# Patient Record
Sex: MALE | Race: BLACK OR AFRICAN AMERICAN | NOT HISPANIC OR LATINO | URBAN - METROPOLITAN AREA
[De-identification: names, ages, dates, MRNs, and addresses within clinical notes are randomized per-mention and may not be internally consistent; named-entity substitution may affect disease eponyms.]

---

## 2022-11-12 ENCOUNTER — EMERGENCY (EMERGENCY)
Facility: HOSPITAL | Age: 52
LOS: 1 days | Discharge: HOME | End: 2022-11-12
Admitting: EMERGENCY MEDICINE

## 2022-11-12 VITALS
SYSTOLIC BLOOD PRESSURE: 145 MMHG | HEART RATE: 88 BPM | TEMPERATURE: 99 F | DIASTOLIC BLOOD PRESSURE: 95 MMHG | OXYGEN SATURATION: 99 % | RESPIRATION RATE: 16 BRPM

## 2022-11-12 VITALS
RESPIRATION RATE: 18 BRPM | SYSTOLIC BLOOD PRESSURE: 143 MMHG | TEMPERATURE: 98 F | HEART RATE: 100 BPM | DIASTOLIC BLOOD PRESSURE: 86 MMHG | OXYGEN SATURATION: 97 %

## 2022-11-12 PROCEDURE — 99283 EMERGENCY DEPT VISIT LOW MDM: CPT

## 2022-11-12 RX ORDER — IBUPROFEN 200 MG
600 TABLET ORAL ONCE
Refills: 0 | Status: COMPLETED | OUTPATIENT
Start: 2022-11-12 | End: 2022-11-12

## 2022-11-12 RX ORDER — DIAZEPAM 5 MG
5 TABLET ORAL ONCE
Refills: 0 | Status: DISCONTINUED | OUTPATIENT
Start: 2022-11-12 | End: 2022-11-12

## 2022-11-12 RX ORDER — LIDOCAINE 4 G/100G
1 CREAM TOPICAL ONCE
Refills: 0 | Status: COMPLETED | OUTPATIENT
Start: 2022-11-12 | End: 2022-11-12

## 2022-11-12 RX ORDER — DIAZEPAM 5 MG
1 TABLET ORAL
Qty: 8 | Refills: 0
Start: 2022-11-12 | End: 2022-11-15

## 2022-11-12 RX ORDER — METHOCARBAMOL 500 MG/1
750 TABLET, FILM COATED ORAL ONCE
Refills: 0 | Status: COMPLETED | OUTPATIENT
Start: 2022-11-12 | End: 2022-11-12

## 2022-11-12 RX ADMIN — Medication 600 MILLIGRAM(S): at 10:29

## 2022-11-12 RX ADMIN — LIDOCAINE 1 PATCH: 4 CREAM TOPICAL at 10:29

## 2022-11-12 RX ADMIN — METHOCARBAMOL 750 MILLIGRAM(S): 500 TABLET, FILM COATED ORAL at 10:29

## 2022-11-12 RX ADMIN — Medication 5 MILLIGRAM(S): at 10:53

## 2022-11-12 NOTE — ED PROVIDER NOTE - PATIENT PORTAL LINK FT
You can access the FollowMyHealth Patient Portal offered by [facility] by registering at the following website: [email or web address]. By joining [employer]’s FollowMyHealth portal, you will also be able to view your health information using other applications (apps) compatible with our system.

## 2022-11-12 NOTE — ED PROVIDER NOTE - NSFOLLOWUPINSTRUCTIONS_ED_ALL_ED_FT
There are no signs of emergency conditions requiring admission to the hospital on today's workup.  Based on the evaluation, a presumptive diagnosis was made, however, further evaluation may be required by your primary care physician or a specialist for a more definitive diagnosis.  Therefore, please follow-up as directed or return to the Emergency Department if your symptoms change or worsen.    We recommend that you:  See your primary care physician within the next 72 hours for follow up.  Bring a copy of your discharge paperwork (including any test results) to your doctor.        *** Return immediately if you have worsening symptoms, chest pain, Shortness of Breath, abdominal pain, Nausea/Vomiting/Diarrhea, dizziness, weakness, confusion, severe headache, vision changes, urinary symptoms, syncope, falls, trauma, discharge, bleeding, fevers, or any other new/concerning symptoms. ***  Back Pain    Back pain is very common in adults. The cause of back pain is rarely dangerous and the pain often gets better over time. The cause of your back pain may not be known and may include strain of muscles or ligaments, degeneration of the spinal disks, or arthritis. Occasionally the pain may radiate down your leg(s). Over-the-counter medicines to reduce pain and inflammation are often the most helpful. Stretching and remaining active frequently helps the healing process.     SEEK IMMEDIATE MEDICAL CARE IF YOU HAVE ANY OF THE FOLLOWING SYMPTOMS: bowel or bladder control problems, unusual weakness or numbness in your arms or legs, nausea or vomiting, abdominal pain, fever, dizziness/lightheadedness.      1. NAPROXEN 500 mg morning and night   2. Valium 1, 2 or 3 times a day for muscle spasms  3. Over the counter SALONPAS SPRAY or LIDOCAINE patches  4. Get a massage (or two)  5. Try hot compresses, showers and baths  6. In 1-2 weeks, start back pain stretches from [employer]  7. In 1-2 months, start back pain strengthening exercises from [email or web address] see your regular doctor to get a referral for PHYSICAL THERAPY  8. If pain persists for 6 weeks, see your doctor for x-rays  9. IF  you get fevers or neurologic deficits - decreased sensation, weakness, tingling in your arms or legs OR  trouble urinating, weight loss or night sweats, then RETURN to the ER or see your doctor ASAP

## 2022-11-12 NOTE — ED PROVIDER NOTE - PROGRESS NOTE DETAILS
FRANNIE Hernández- Upon reassessment not much relief additional pain medication ordered and will reassess. FRANNIE Hernández- Patient feels much improved, medication sent to pharmacy and recommendation to follow-up with orthopedist, he verbalized understanding follow-up instructions.

## 2022-11-12 NOTE — ED ADULT TRIAGE NOTE - CHIEF COMPLAINT QUOTE
Pt c/o 1.5 months of right lower back pain radiating to right leg and groin. Pt states he developed the pain while working for [employer] and lifting heavy weights.

## 2022-11-12 NOTE — ED PROVIDER NOTE - CLINICAL SUMMARY MEDICAL DECISION MAKING FREE TEXT BOX
This is a 52-year-old male past medical history hypertension with complaint of back pain for 2 months. Reports he was working for [employer] and lifting heavy objects. Since then had to quit his job because of constant back pain  radiating to the right  thigh and the right groin area. Denies any fever chills shortness of breath, dizziness, headache, numbness, tingling. Denies any changes in bladder and bowel habits, denies any blood in the urine or stool, denies any erectile dysfunction swelling or scrotum. Reports first taking Flexeril and lidocaine patch with no relief.   symptom management reassess

## 2022-11-12 NOTE — ED PROVIDER NOTE - OBJECTIVE STATEMENT
This is a 52-year-old male past medical history hypertension with complaint of back pain for 2 months. Reports he was working for [employer] and lifting heavy objects. Since then had to quit his job because of constant back pain  radiating to the right  thigh and the right groin area. Denies any fever chills shortness of breath, dizziness, headache, numbness, tingling. Denies any changes in bladder and bowel habits, denies any blood in the urine or stool, denies any erectile dysfunction swelling or scrotum. Reports first taking Flexeril and lidocaine patch with no relief
